# Patient Record
Sex: MALE | URBAN - METROPOLITAN AREA
[De-identification: names, ages, dates, MRNs, and addresses within clinical notes are randomized per-mention and may not be internally consistent; named-entity substitution may affect disease eponyms.]

---

## 2019-09-04 ENCOUNTER — OFFICE VISIT (OUTPATIENT)
Dept: FAMILY MEDICINE CLINIC | Facility: CLINIC | Age: 59
End: 2019-09-04
Payer: COMMERCIAL

## 2019-09-04 VITALS
HEIGHT: 71.25 IN | DIASTOLIC BLOOD PRESSURE: 86 MMHG | SYSTOLIC BLOOD PRESSURE: 146 MMHG | OXYGEN SATURATION: 97 % | TEMPERATURE: 98 F | BODY MASS INDEX: 30.22 KG/M2 | RESPIRATION RATE: 20 BRPM | WEIGHT: 218.25 LBS | HEART RATE: 72 BPM

## 2019-09-04 DIAGNOSIS — Z12.11 COLON CANCER SCREENING: ICD-10-CM

## 2019-09-04 DIAGNOSIS — Z00.00 ANNUAL PHYSICAL EXAM: Primary | ICD-10-CM

## 2019-09-04 PROCEDURE — 99386 PREV VISIT NEW AGE 40-64: CPT | Performed by: FAMILY MEDICINE

## 2019-09-29 NOTE — H&P
Scooter Torres is a 61year old male who presents for a complete physical exam.   HPI:   Pt complains of nothing. Colonoscopy: needs another colonoscopy. No current outpatient medications on file. History reviewed.  No pertinent past medical history non-enlarged, no nodules  MS: Jose E, good tone & strength  EXT: no cyanosis, clubbing or edema  NEURO: Alert & oriented x 3, LEs +2DTRs  PSYCH: Mood and affect appropriate    ASSESSMENT AND PLAN:   Autumn Wasseramn is a 61year old male who presents for a compl

## (undated) NOTE — LETTER
10/25/19        Arden Espino    [address] [address]      Dear Kelvin Wharton,    [address] [facility] records indicate that you have outstanding lab work and or testing that was ordered for you and has not yet been completed:  Orders Placed This Encounter      L